# Patient Record
Sex: FEMALE | Race: WHITE | NOT HISPANIC OR LATINO | Employment: FULL TIME | URBAN - METROPOLITAN AREA
[De-identification: names, ages, dates, MRNs, and addresses within clinical notes are randomized per-mention and may not be internally consistent; named-entity substitution may affect disease eponyms.]

---

## 2018-03-12 ENCOUNTER — HISTORICAL (OUTPATIENT)
Dept: RADIOLOGY | Facility: HOSPITAL | Age: 53
End: 2018-03-12

## 2018-03-12 LAB
BUN SERPL-MCNC: 15 MG/DL (ref 7–18)
CREAT SERPL-MCNC: 0.84 MG/DL (ref 0.6–1.3)

## 2018-05-30 ENCOUNTER — HISTORICAL (OUTPATIENT)
Dept: RADIOLOGY | Facility: HOSPITAL | Age: 53
End: 2018-05-30

## 2018-05-30 LAB
BUN SERPL-MCNC: 11 MG/DL (ref 7–18)
CREAT SERPL-MCNC: 0.75 MG/DL (ref 0.6–1.3)
DEPRECATED CALCIDIOL+CALCIFEROL SERPL-MC: 20.59 NG/ML (ref 30–80)
ERYTHROCYTE [SEDIMENTATION RATE] IN BLOOD: 25 MM/HR (ref 0–20)
VIT B12 SERPL-MCNC: 550 PG/ML (ref 193–986)

## 2018-06-01 LAB — DEPRECATED CALCIDIOL+CALCIFEROL SERPL-MC: 18 NG/ML (ref 30–80)

## 2018-06-25 ENCOUNTER — HISTORICAL (OUTPATIENT)
Dept: CARDIOLOGY | Facility: HOSPITAL | Age: 53
End: 2018-06-25

## 2018-06-25 LAB
APTT PPP: 30.9 SECOND(S) (ref 24.8–36.9)
GLUCOSE CSF-MCNC: 52 MG/DL (ref 40–70)
INR PPP: 0.87 (ref 0–1.27)
PLATELET # BLD AUTO: 224 X10(3)/MCL (ref 130–400)
PROT CSF-MCNC: 31 MG/DL (ref 15–45)
PROTHROMBIN TIME: 12.1 SECOND(S) (ref 12.2–14.7)

## 2018-06-27 ENCOUNTER — HISTORICAL (OUTPATIENT)
Dept: ADMINISTRATIVE | Facility: HOSPITAL | Age: 53
End: 2018-06-27

## 2018-06-30 LAB
FINAL CULTURE: NORMAL
GRAM STN SPEC: NORMAL

## 2018-07-02 ENCOUNTER — HISTORICAL (OUTPATIENT)
Dept: RADIOLOGY | Facility: HOSPITAL | Age: 53
End: 2018-07-02

## 2018-08-16 ENCOUNTER — HISTORICAL (OUTPATIENT)
Dept: RADIOLOGY | Facility: HOSPITAL | Age: 53
End: 2018-08-16

## 2018-11-19 ENCOUNTER — HISTORICAL (OUTPATIENT)
Dept: RADIOLOGY | Facility: HOSPITAL | Age: 53
End: 2018-11-19

## 2022-04-11 ENCOUNTER — HISTORICAL (OUTPATIENT)
Dept: ADMINISTRATIVE | Facility: HOSPITAL | Age: 57
End: 2022-04-11

## 2022-04-29 VITALS
BODY MASS INDEX: 23.68 KG/M2 | WEIGHT: 138.69 LBS | DIASTOLIC BLOOD PRESSURE: 78 MMHG | SYSTOLIC BLOOD PRESSURE: 111 MMHG | HEIGHT: 64 IN

## 2022-05-06 ENCOUNTER — LAB VISIT (OUTPATIENT)
Dept: LAB | Facility: HOSPITAL | Age: 57
End: 2022-05-06
Attending: NURSE PRACTITIONER
Payer: COMMERCIAL

## 2022-05-06 DIAGNOSIS — L65.9 HAIR THINNING: Primary | ICD-10-CM

## 2022-05-06 LAB
ALBUMIN SERPL-MCNC: 4 GM/DL (ref 3.5–5)
ALBUMIN/GLOB SERPL: 1.2 RATIO (ref 1.1–2)
ALP SERPL-CCNC: 93 UNIT/L (ref 40–150)
ALT SERPL-CCNC: 21 UNIT/L (ref 0–55)
AST SERPL-CCNC: 24 UNIT/L (ref 5–34)
BILIRUBIN DIRECT+TOT PNL SERPL-MCNC: 0.3 MG/DL (ref 0–0.5)
BILIRUBIN DIRECT+TOT PNL SERPL-MCNC: 0.5 MG/DL (ref 0–0.8)
BILIRUBIN DIRECT+TOT PNL SERPL-MCNC: 0.8 MG/DL
BUN SERPL-MCNC: 14.5 MG/DL (ref 9.8–20.1)
CALCIUM SERPL-MCNC: 9.6 MG/DL (ref 8.4–10.2)
CHLORIDE SERPL-SCNC: 105 MMOL/L (ref 98–107)
CO2 SERPL-SCNC: 27 MMOL/L (ref 22–29)
CREAT SERPL-MCNC: 0.93 MG/DL (ref 0.55–1.02)
ERYTHROCYTE [DISTWIDTH] IN BLOOD BY AUTOMATED COUNT: 12.4 % (ref 11.5–17)
GLOBULIN SER-MCNC: 3.4 GM/DL (ref 2.4–3.5)
GLUCOSE SERPL-MCNC: 73 MG/DL (ref 74–100)
HCT VFR BLD AUTO: 43.4 % (ref 37–47)
HGB BLD-MCNC: 13.7 GM/DL (ref 12–16)
MCH RBC QN AUTO: 30 PG (ref 27–31)
MCHC RBC AUTO-ENTMCNC: 31.6 MG/DL (ref 33–36)
MCV RBC AUTO: 95 FL (ref 80–94)
PLATELET # BLD AUTO: 245 X10(3)/MCL (ref 130–400)
PMV BLD AUTO: 9.3 FL (ref 9.4–12.4)
POTASSIUM SERPL-SCNC: 4 MMOL/L (ref 3.5–5.1)
PROT SERPL-MCNC: 7.4 GM/DL (ref 6.4–8.3)
RBC # BLD AUTO: 4.57 X10(6)/MCL (ref 4.2–5.4)
SODIUM SERPL-SCNC: 144 MMOL/L (ref 136–145)
WBC # SPEC AUTO: 5.8 X10(3)/MCL (ref 4.5–11.5)

## 2022-05-06 PROCEDURE — 36415 COLL VENOUS BLD VENIPUNCTURE: CPT

## 2022-05-06 PROCEDURE — 80053 COMPREHEN METABOLIC PANEL: CPT

## 2022-05-06 PROCEDURE — 85027 COMPLETE CBC AUTOMATED: CPT

## 2022-05-06 PROCEDURE — 86039 ANTINUCLEAR ANTIBODIES (ANA): CPT

## 2022-05-10 LAB — ANA SER QL HEP2 SUBST: NORMAL

## 2022-07-04 ENCOUNTER — OFFICE VISIT (OUTPATIENT)
Dept: URGENT CARE | Facility: CLINIC | Age: 57
End: 2022-07-04
Payer: COMMERCIAL

## 2022-07-04 VITALS
SYSTOLIC BLOOD PRESSURE: 129 MMHG | BODY MASS INDEX: 22.2 KG/M2 | HEART RATE: 76 BPM | TEMPERATURE: 99 F | HEIGHT: 64 IN | RESPIRATION RATE: 20 BRPM | OXYGEN SATURATION: 97 % | WEIGHT: 130 LBS | DIASTOLIC BLOOD PRESSURE: 79 MMHG

## 2022-07-04 DIAGNOSIS — R09.81 CONGESTION OF NASAL SINUS: Primary | ICD-10-CM

## 2022-07-04 LAB
CTP QC/QA: YES
SARS-COV-2 RDRP RESP QL NAA+PROBE: NEGATIVE

## 2022-07-04 PROCEDURE — 99203 OFFICE O/P NEW LOW 30 MIN: CPT | Mod: 25,,, | Performed by: PHYSICIAN ASSISTANT

## 2022-07-04 PROCEDURE — 1159F MED LIST DOCD IN RCRD: CPT | Mod: CPTII,,, | Performed by: PHYSICIAN ASSISTANT

## 2022-07-04 PROCEDURE — U0002 COVID-19 LAB TEST NON-CDC: HCPCS | Mod: QW,,, | Performed by: PHYSICIAN ASSISTANT

## 2022-07-04 PROCEDURE — 3078F DIAST BP <80 MM HG: CPT | Mod: CPTII,,, | Performed by: PHYSICIAN ASSISTANT

## 2022-07-04 PROCEDURE — 99203 PR OFFICE/OUTPT VISIT, NEW, LEVL III, 30-44 MIN: ICD-10-PCS | Mod: 25,,, | Performed by: PHYSICIAN ASSISTANT

## 2022-07-04 PROCEDURE — 96372 THER/PROPH/DIAG INJ SC/IM: CPT | Mod: ,,, | Performed by: PHYSICIAN ASSISTANT

## 2022-07-04 PROCEDURE — 1159F PR MEDICATION LIST DOCUMENTED IN MEDICAL RECORD: ICD-10-PCS | Mod: CPTII,,, | Performed by: PHYSICIAN ASSISTANT

## 2022-07-04 PROCEDURE — 3074F PR MOST RECENT SYSTOLIC BLOOD PRESSURE < 130 MM HG: ICD-10-PCS | Mod: CPTII,,, | Performed by: PHYSICIAN ASSISTANT

## 2022-07-04 PROCEDURE — 96372 PR INJECTION,THERAP/PROPH/DIAG2ST, IM OR SUBCUT: ICD-10-PCS | Mod: ,,, | Performed by: PHYSICIAN ASSISTANT

## 2022-07-04 PROCEDURE — 1160F RVW MEDS BY RX/DR IN RCRD: CPT | Mod: CPTII,,, | Performed by: PHYSICIAN ASSISTANT

## 2022-07-04 PROCEDURE — 3078F PR MOST RECENT DIASTOLIC BLOOD PRESSURE < 80 MM HG: ICD-10-PCS | Mod: CPTII,,, | Performed by: PHYSICIAN ASSISTANT

## 2022-07-04 PROCEDURE — 3008F PR BODY MASS INDEX (BMI) DOCUMENTED: ICD-10-PCS | Mod: CPTII,,, | Performed by: PHYSICIAN ASSISTANT

## 2022-07-04 PROCEDURE — 3008F BODY MASS INDEX DOCD: CPT | Mod: CPTII,,, | Performed by: PHYSICIAN ASSISTANT

## 2022-07-04 PROCEDURE — 1160F PR REVIEW ALL MEDS BY PRESCRIBER/CLIN PHARMACIST DOCUMENTED: ICD-10-PCS | Mod: CPTII,,, | Performed by: PHYSICIAN ASSISTANT

## 2022-07-04 PROCEDURE — U0002: ICD-10-PCS | Mod: QW,,, | Performed by: PHYSICIAN ASSISTANT

## 2022-07-04 PROCEDURE — 3074F SYST BP LT 130 MM HG: CPT | Mod: CPTII,,, | Performed by: PHYSICIAN ASSISTANT

## 2022-07-04 RX ORDER — OMEPRAZOLE 40 MG/1
40 CAPSULE, DELAYED RELEASE ORAL EVERY MORNING
COMMUNITY
Start: 2022-05-24

## 2022-07-04 RX ORDER — VALACYCLOVIR HYDROCHLORIDE 500 MG/1
500 TABLET, FILM COATED ORAL 2 TIMES DAILY
COMMUNITY
Start: 2022-06-20

## 2022-07-04 RX ORDER — DIAZEPAM 5 MG/1
5 TABLET ORAL DAILY PRN
COMMUNITY
Start: 2022-06-17

## 2022-07-04 RX ORDER — ESZOPICLONE 1 MG/1
1 TABLET, FILM COATED ORAL NIGHTLY PRN
COMMUNITY
Start: 2022-05-26

## 2022-07-04 RX ORDER — BETAMETHASONE SODIUM PHOSPHATE AND BETAMETHASONE ACETATE 3; 3 MG/ML; MG/ML
9 INJECTION, SUSPENSION INTRA-ARTICULAR; INTRALESIONAL; INTRAMUSCULAR; SOFT TISSUE
Status: COMPLETED | OUTPATIENT
Start: 2022-07-04 | End: 2022-07-04

## 2022-07-04 RX ORDER — KETOCONAZOLE 20 MG/ML
SHAMPOO, SUSPENSION TOPICAL
COMMUNITY
Start: 2022-06-17

## 2022-07-04 RX ADMIN — BETAMETHASONE SODIUM PHOSPHATE AND BETAMETHASONE ACETATE 9 MG: 3; 3 INJECTION, SUSPENSION INTRA-ARTICULAR; INTRALESIONAL; INTRAMUSCULAR; SOFT TISSUE at 12:07

## 2022-07-04 NOTE — PATIENT INSTRUCTIONS
Drink plenty of fluids    Get plenty of rest.    Follow-up with your primary care doctor      Go to emergency department with any significant change or worsening symptoms.    Tylenol or Motrin as needed for fever.    Flonase as directed.

## 2022-07-04 NOTE — PROGRESS NOTES
"Subjective:       Patient ID: Silvia Boone is a 57 y.o. female.    Vitals:  height is 5' 4" (1.626 m) and weight is 59 kg (130 lb). Her temperature is 98.6 °F (37 °C). Her blood pressure is 129/79 and her pulse is 76. Her respiration is 20 and oxygen saturation is 97%.     Chief Complaint: Nasal Congestion (Likely seasonal sinus infection - Entered by patient)    Pt is a 57 y.o female c/o a 1 week hx of nasal congestion and sinus pressure. Denies any f/c,cough, sore throat, neck stiffness, rash, or gi sx. Pt without relief from flonase.     ROS    Objective:      Physical Exam   Constitutional: She is oriented to person, place, and time. She appears well-developed. She is cooperative.  Non-toxic appearance. She does not appear ill. No distress.   HENT:   Head: Normocephalic and atraumatic.   Ears:   Right Ear: Hearing, tympanic membrane, external ear and ear canal normal.   Left Ear: Hearing, tympanic membrane, external ear and ear canal normal.   Nose: Nose normal. No mucosal edema, rhinorrhea or nasal deformity. No epistaxis. Right sinus exhibits no maxillary sinus tenderness and no frontal sinus tenderness. Left sinus exhibits no maxillary sinus tenderness and no frontal sinus tenderness.   Mouth/Throat: Uvula is midline, oropharynx is clear and moist and mucous membranes are normal. No trismus in the jaw. Normal dentition. No uvula swelling. No oropharyngeal exudate, posterior oropharyngeal edema or posterior oropharyngeal erythema.   Eyes: Conjunctivae and lids are normal. No scleral icterus.   Neck: Trachea normal and phonation normal. Neck supple. No edema present. No erythema present. No neck rigidity present.   Cardiovascular: Normal rate, regular rhythm, normal heart sounds and normal pulses.   Pulmonary/Chest: Effort normal and breath sounds normal. No respiratory distress. She has no decreased breath sounds. She has no rhonchi.   Abdominal: Normal appearance.   Musculoskeletal: Normal range of motion. " "        General: No deformity. Normal range of motion.   Neurological: She is alert and oriented to person, place, and time. She exhibits normal muscle tone. Coordination normal.   Skin: Skin is warm, dry, intact, not diaphoretic and not pale.   Psychiatric: Her speech is normal and behavior is normal. Judgment and thought content normal.   Nursing note and vitals reviewed.      Pt elected to withhold abx at this time and see what the shot does for her. Consider Doxycycline if no sx improvement in the next few days.        Previous History      Review of patient's allergies indicates:   Allergen Reactions    Kiwi Hives    Levofloxacin      Other reaction(s): Facial Swelling, mouth tingles, lips swell    [place] Diarrhea    Penicillins Swelling     Other reaction(s): Facial Swelling    Pineapple Diarrhea    Sulfa (sulfonamide antibiotics) Swelling     Other reaction(s): severe swelling       Past Medical History:   Diagnosis Date    Endometriosis, unspecified     History Of     Current Outpatient Medications   Medication Instructions    diazePAM (VALIUM) 5 mg, Oral, Daily PRN    eszopiclone (LUNESTA) 1 mg, Oral, Nightly PRN    ketoconazole (NIZORAL) 2 % shampoo Topical (Top), Three times weekly    omeprazole (PRILOSEC) 40 mg, Oral, Every morning    UNABLE TO FIND TAKE ONE CAPSULE BY MOUTH EVERY MORNING    valACYclovir (VALTREX) 500 mg, Oral, 2 times daily     Past Surgical History:   Procedure Laterality Date    ADENOIDECTOMY      CHOLECYSTECTOMY      HERNIA REPAIR      POLYPECTOMY      uterine polypectomy    TONSILLECTOMY       History reviewed. No pertinent family history.    Social History     Tobacco Use    Smoking status: Former Smoker    Smokeless tobacco: Never Used        Physical Exam      Vital Signs Reviewed   /79   Pulse 76   Temp 98.6 °F (37 °C)   Resp 20   Ht 5' 4" (1.626 m)   Wt 59 kg (130 lb)   SpO2 97%   BMI 22.31 kg/m²        Procedures    Procedures     Labs "     Results for orders placed or performed in visit on 05/06/22   CBC Without Differential   Result Value Ref Range    WBC 5.8 4.5 - 11.5 x10(3)/mcL    RBC 4.57 4.20 - 5.40 x10(6)/mcL    Hgb 13.7 12.0 - 16.0 gm/dL    Hct 43.4 37.0 - 47.0 %    Platelet 245 130 - 400 x10(3)/mcL    MCV 95.0 (H) 80.0 - 94.0 fL    MCH 30.0 27.0 - 31.0 pg    MCHC 31.6 (L) 33.0 - 36.0 mg/dL    RDW 12.4 11.5 - 17.0 %    MPV 9.3 (L) 9.4 - 12.4 fL   Comprehensive Metabolic Panel   Result Value Ref Range    Sodium Level 144 136 - 145 mmol/L    Potassium Level 4.0 3.5 - 5.1 mmol/L    Chloride 105 98 - 107 mmol/L    Carbon Dioxide 27 22 - 29 mmol/L    Glucose Level 73 (L) 74 - 100 mg/dL    Blood Urea Nitrogen 14.5 9.8 - 20.1 mg/dL    Creatinine 0.93 0.55 - 1.02 mg/dL    Calcium Level Total 9.6 8.4 - 10.2 mg/dL    Protein Total 7.4 6.4 - 8.3 gm/dL    Albumin Level 4.0 3.5 - 5.0 gm/dL    Globulin 3.4 2.4 - 3.5 gm/dL    Albumin/Globulin Ratio 1.2 1.1 - 2.0 ratio    Bilirubin Total 0.8 <=1.5 mg/dL    Bilirubin Direct 0.3 0.0 - 0.5 mg/dL    Bilirubin Indirect 0.50 0.00 - 0.80 mg/dL    Alkaline Phosphatase 93 40 - 150 unit/L    Alanine Aminotransferase 21 0 - 55 unit/L    Aspartate Aminotransferase 24 5 - 34 unit/L    Estimated GFR-Non  >60 mls/min/1.73/m2   TASHA IgG by IFA   Result Value Ref Range    Antinuclear Ab, HEp-2 Substrate <1:80 (Negative) <1:80 (Negative)         Assessment:       1. Congestion of nasal sinus          Plan:         Congestion of nasal sinus  -     POCT COVID-19 Rapid Screening  -     betamethasone acetate-betamethasone sodium phosphate injection 9 mg    Drink plenty of fluids    Get plenty of rest.    Follow-up with your primary care doctor      Go to emergency department with any significant change or worsening symptoms.    Tylenol or Motrin as needed for fever.  Flonase as directed.

## 2022-07-20 ENCOUNTER — LAB REQUISITION (OUTPATIENT)
Dept: LAB | Facility: HOSPITAL | Age: 57
End: 2022-07-20
Payer: COMMERCIAL

## 2022-07-20 DIAGNOSIS — L30.9 DERMATITIS, UNSPECIFIED: ICD-10-CM

## 2022-07-20 PROCEDURE — 87070 CULTURE OTHR SPECIMN AEROBIC: CPT | Performed by: DERMATOLOGY

## 2022-07-20 PROCEDURE — 87102 FUNGUS ISOLATION CULTURE: CPT | Performed by: DERMATOLOGY

## 2022-07-23 LAB — BACTERIA SPEC CULT: NORMAL

## 2022-08-22 LAB — FUNGUS SPEC CULT: NORMAL

## 2025-04-14 DIAGNOSIS — H55.00 SYMPTOMATIC NYSTAGMUS: Primary | ICD-10-CM

## 2025-04-14 DIAGNOSIS — S06.5X0A SUBDURAL HEMORRHAGE FOLLOWING INJURY, WITH OPEN INTRACRANIAL WOUND, NO LOSS OF CONSCIOUSNESS: ICD-10-CM
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
## 2025-04-14 DIAGNOSIS — S01.90XA SUBDURAL HEMORRHAGE FOLLOWING INJURY, WITH OPEN INTRACRANIAL WOUND, NO LOSS OF CONSCIOUSNESS: ICD-10-CM
